# Patient Record
Sex: MALE | URBAN - METROPOLITAN AREA
[De-identification: names, ages, dates, MRNs, and addresses within clinical notes are randomized per-mention and may not be internally consistent; named-entity substitution may affect disease eponyms.]

---

## 2022-07-07 ENCOUNTER — HOSPITAL ENCOUNTER (OUTPATIENT)
Dept: PHYSICAL THERAPY | Age: 27
Discharge: HOME | End: 2022-07-07
Payer: COMMERCIAL

## 2022-07-07 PROCEDURE — 97535 SELF CARE MNGMENT TRAINING: CPT | Performed by: PHYSICAL THERAPIST

## 2022-07-07 PROCEDURE — 97163 PT EVAL HIGH COMPLEX 45 MIN: CPT | Performed by: PHYSICAL THERAPIST

## 2022-07-07 NOTE — PROGRESS NOTES
ANNABEL [facility]que [address] [facility]   [facility], [address],[facility], [facility] - Phone: [phone]  Fax: [phone] / [address] [facility]  Patient Name: Danny Jo : 1995   Medical   Diagnosis: S/p concussion Treatment Diagnosis: Concussion [S06.0X9A]   Onset Date: Multiple, recent 22     Referral Source: Olga Galicia MD Start of Care [facility]): 2022   Prior Hospitalization: See medical history Provider #: [id number]   Prior Level of Function: Able to complete all adls and work   Comorbidities: Hyperthyroidism, depression, multiple TBI   Medications: Verified on Patient Summary List   The Plan of Care and following information is based on the information from the initial evaluation.   ===========================================================================================  Assessment / key information:  32 M referred to physical therapy s/p concussion in 2021. Pt has a very complex pmh. He states that he last concussion was his 8th with the first being 14 years ago requiring extensive neurological rehabilitation. With each subsequent incident his sx including HA, visual disturbances, balance deficits, memory loss and sleep disorders progressed. I did not have access to his records (advised pt to obtain) so he was the primary historian. He states that he was \"incorrectly diagnosed with bipolar disorder\" and was placed on multiple medications to address. He reports multiple psychiatric admissions and ultimately became housebound. He reports until about 6 weeks ago he was unable to work, perform  or household chores and was using a cane for balance. He reports he has been triturating off all his meds besides current 3 and notes 70% improvement in QOL. He is now able to drive and assist with his kids at home.  His chief c/o still continues to be retro orbital, L temporal and forehead HA. 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 He did have 2 session with mfr therapist with good results with HA reduction. At this time the IE was stopped due to time constraints. Will obtain objective report next session. Pt STATES THAT HE HAS A MEDICAL CLEARANCE TO ABSTAIN FROM FACE COVERING. CAN YOU PLEASE CONFIRM THIS  ===========================================================================================  Eval Complexity: History HIGH Complexity :3+ comorbidities / personal factors will impact the outcome/ POC ;  Examination  HIGH Complexity : 4+ Standardized tests and measures addressing body structure, function, activity limitation and / or participation in recreation ; Presentation HIGH Complexity : Unstable and unpredictable characteristics ; Decision Making Other outcome measures DHI  HIGH ; Overall Complexity HIGH   Problem List: pain affecting function, decrease ROM, decrease strength, impaired gait/ balance, decrease ADL/ functional abilitiies, decrease activity tolerance, decrease flexibility/ joint mobility and decrease transfer abilities   Treatment Plan may include any combination of the following: Therapeutic exercise, Therapeutic activities, Neuromuscular re-education, Physical agent/modality, Gait/balance training, Manual therapy, Patient education, Self Care training, Functional mobility training, Home safety training and Stair training  Patient / Family readiness to learn indicated by: asking questions, trying to perform skills and interest  Persons(s) to be included in education: patient (P)  Barriers to Learning/Limitations: yes;  emotional, cognitive and physical  Measures taken, if barriers to learnin:1 WITH Same therapist   Patient Goal (s): Reduce pain, improve ha, increase walking tolerance    Patient self reported health status: poor  Rehabilitation Potential: fair   Short Term Goals:  To be accomplished in  2  weeks:  1. Obtain 6mwt  2. Obtain c/s findings  3. Compliant with hep   Long Term Goals: To be accomplished in  4  weeks:  1. Report 25% improvement in s/s  2. Increase DHI by 10 points in order to show functional improvement  3. +3 on GROC in order to show functional improvement   Frequency / Duration:   Patient to be seen  2-3  times per week for 4  weeks:  Patient / Caregiver education and instruction: self care and activity modification  Therapist Signature: Anabella Lovell PT Date: 6/1/7393   Certification Period: na Time: 10:59 AM   ===========================================================================================  I certify that the above Physical Therapy Services are being furnished while the patient is under my care. I agree with the treatment plan and certify that this therapy is necessary. Physician Signature:        Date:       Time:                                        Jaret Weinberg MD  Please sign and return to [facility] at [facility], [facility]. or you may fax the signed copy to [phone]. Thank you.

## 2022-07-07 NOTE — PROGRESS NOTES
[facility] - DAILY TREATMENT NOTE    Patient Name: Anuja Figueroa        Date: 2022  : 1995   yes Patient  Verified  Visit #:     Insurance: Payor: Bernarda Khan / Plan: Algis Bamberger / Product Type: HMO /      In time: 1000 Out time: 1050   Total Treatment Time: 50     Medicare/[facility] Time Tracking (below)   Total Timed Codes (min):  15 1:1 Treatment Time:  15     TREATMENT AREA =  Concussion [S06.0X9A]    SUBJECTIVE  Pain Level (on 0 to 10 scale):  2  / 10   Medication Changes/New allergies or changes in medical history, any new surgeries or procedures?    no  If yes, update Summary List   Subjective Functional Status/Changes:  []  No changes reported     See ie          OBJECTIVE      15 min Self Care: poc   Rationale:    develop a plan of care to improve the patients ability to return to adls    Billed With/As:   [] TE   [] TA   [] Neuro   [] Self Care Patient Education: [x] Review HEP    [] Progressed/Changed HEP based on:   [] positioning   [] body mechanics   [] transfers   [] heat/ice application    [] other:      Other Objective/Functional Measures:    Reviewed at length prognosis and poc with physical therapy. See ie     Post Treatment Pain Level (on 0 to 10) scale:   2 / 10     ASSESSMENT  Assessment/Changes in Function:     See ie     []  See Progress Note/Recertification   Patient will continue to benefit from skilled PT services to modify and progress therapeutic interventions, address functional mobility deficits, address ROM deficits, address strength deficits, analyze and address soft tissue restrictions, analyze and cue movement patterns, analyze and modify body mechanics/ergonomics, assess and modify postural abnormalities, address imbalance/dizziness and instruct in home and community integration to attain remaining goals.    Progress toward goals / Updated goals:    See ie     PLAN  []  Upgrade activities as tolerated yes Continue plan of care   [] Discharge due to :    []  Other:      Therapist: Hui Woodward PT    Date: 7/7/2022 Time: 10:58 AM     Future Appointments   Date Time Provider Sagrario Da Silva   7/11/2022  2:45 PM Leland Gonzalez, PT [facility]   7/13/2022 11:45 AM Leland Gonzalez, PT [facility]   7/18/2022 12:00 PM Avi Rankin [facility]   7/20/2022 11:45 AM Leland Gonzalez, PT [facility]   7/25/2022 12:00 PM Leland Gonzalez, PT [facility]   7/27/2022 11:45 AM Leland Gonzalez, PT [facility]   8/8/2022 12:00 PM Yanet Gracia, PT [facility]   8/12/2022  8:30 AM Yanet Gracia, PT [facility]

## 2022-07-11 ENCOUNTER — TELEPHONE (OUTPATIENT)
Dept: PHYSICAL THERAPY | Age: 27
End: 2022-07-11

## 2022-07-11 ENCOUNTER — APPOINTMENT (OUTPATIENT)
Dept: PHYSICAL THERAPY | Age: 27
End: 2022-07-11
Payer: COMMERCIAL

## 2022-07-13 ENCOUNTER — APPOINTMENT (OUTPATIENT)
Dept: PHYSICAL THERAPY | Age: 27
End: 2022-07-13
Payer: COMMERCIAL

## 2022-07-18 ENCOUNTER — HOSPITAL ENCOUNTER (OUTPATIENT)
Dept: PHYSICAL THERAPY | Age: 27
End: 2022-07-18
Payer: COMMERCIAL

## 2022-07-20 ENCOUNTER — APPOINTMENT (OUTPATIENT)
Dept: PHYSICAL THERAPY | Age: 27
End: 2022-07-20
Payer: COMMERCIAL

## 2022-07-25 ENCOUNTER — APPOINTMENT (OUTPATIENT)
Dept: PHYSICAL THERAPY | Age: 27
End: 2022-07-25
Payer: COMMERCIAL

## 2022-07-27 ENCOUNTER — APPOINTMENT (OUTPATIENT)
Dept: PHYSICAL THERAPY | Age: 27
End: 2022-07-27
Payer: COMMERCIAL

## 2022-08-08 ENCOUNTER — APPOINTMENT (OUTPATIENT)
Dept: PHYSICAL THERAPY | Age: 27
End: 2022-08-08

## 2022-08-12 ENCOUNTER — APPOINTMENT (OUTPATIENT)
Dept: PHYSICAL THERAPY | Age: 27
End: 2022-08-12